# Patient Record
Sex: FEMALE | Race: WHITE | NOT HISPANIC OR LATINO | Employment: UNEMPLOYED | URBAN - METROPOLITAN AREA
[De-identification: names, ages, dates, MRNs, and addresses within clinical notes are randomized per-mention and may not be internally consistent; named-entity substitution may affect disease eponyms.]

---

## 2024-11-12 ENCOUNTER — TELEPHONE (OUTPATIENT)
Dept: PAIN MEDICINE | Facility: CLINIC | Age: 44
End: 2024-11-12

## 2024-11-12 NOTE — TELEPHONE ENCOUNTER
Received fax from ortho for patient from external provider. Patient needs either IPM or NRSX. Called patient and advised her that we are not taking any new Medicaid patients at this time. Advised patient to refer back to ordering provider or contact the Medicaid office. I also provided her with [facility]'s number to see if they can provide direction to a participating provider. Patient was very appreciative and v/u.  Grace Flores RN

## 2024-11-18 ENCOUNTER — TELEPHONE (OUTPATIENT)
Dept: SURGERY | Facility: CLINIC | Age: 44
End: 2024-11-18
Payer: MEDICAID

## 2024-12-02 NOTE — PROGRESS NOTES
"Initial Consult  Gina Wang  Chief Complaint   Patient presents with    Consult     Interested in VSG       History of Present Illness:  Patient is a 44 y.o. female who is referred for evaluation of surgical treatment of morbid obesity. Her Body mass index is 41.38 kg/m². She has known comorbidities of diabetes mellitus, dyslipidemia, GERD, hypertension, and obstructive sleep apnea. She has attended the bariatric seminar and is most interested in gastric sleeve surgery. The patient has had multiple unsuccessful diet attempts in the past without sustained weight loss. With multiple unsuccessful weight loss attempts, the patient believes they are ready for surgical intervention.     Denies any complaints or issues today for visit    Increased child after child once she started depo shot, gained 100lbs.   Recently lost 100lbs     Labs (10/3/24): WNLs    Menstrual cycle: hysterectomy  Occupation: does not work.     Anemia/easy brusing/bleeding - [] Yes   [x] No   Smoker - [] Yes   [x] No  [] Resolved  Hx of blood clots - [] Yes   [x] No     Ht: 5' 1" (1.549 m)   Weights:   Trend Weights BMI   Starting 219# 41   Pre-Op     2 Week     2 Month     6 Month      1 Year     2 Year               For Adults 20 Years and Older:  BMI Weight Status   Below 18.5 Underweight   18.6-24.9 Normal/Healthy   25.0-29.9 Overweight   30.0 & Above Obese     Ideal Weight Range for Your Height: 5'1" = 100 - 131 lbs     Pertinent History:  HTN - [x] Yes   [] No    [] Resolved  HLD - [x] Yes   [] No    [] Resolved  DM  -  [x] Yes   [] No    [] Resolved  JALYN - [x] Yes   [] No   [] Resolved, does not wear  GERD - [x] Yes   [] No [] Resolved  Anticoagulation- [] Yes   [x] No      If yes, type: [] ASA [] Plavix [] Other    Patient Care Team:  Deisy Cedillo NP as PCP - General (Internal Medicine)  Akil Stover MD as Consulting Physician (Bariatrics)  Jesús Farley MD as Consulting Physician (Gastroenterology)  Santosh Chambers " "MD DARRYL (Obstetrics and Gynecology)     Subjective:     12 point review of systems conducted, negative except as stated in the history of present illness. See HPI for details.    Review of patient's allergies indicates:   Allergen Reactions    Ortho tri-cyclen (21) Rash     Past Medical History:   Diagnosis Date    ADD (attention deficit disorder)     Diabetes mellitus, type 2     GERD (gastroesophageal reflux disease)     Hyperlipidemia     Hypertension     Lipoma of lower leg     Morbid obesity     Sleep apnea      Past Surgical History:   Procedure Laterality Date     SECTION      x2    ELBOW SURGERY      HYSTERECTOMY  12/15/2022    LIPOMA RESECTION Right 2023    Procedure: EXCISION, LIPOMA;  Surgeon: Carly Hernandez MD;  Location: [facility] MAIN OR;  Service: General;  Laterality: Right;  Intramuscular    MASS EXCISION       Family History   Problem Relation Name Age of Onset    Stroke Mother      Hypertension Mother      Stroke Father      Hypertension Father      Heart attack Father      Cancer Maternal Grandmother      Heart attack Maternal Grandfather      Cancer Paternal Grandmother       Social History     Tobacco Use    Smoking status: Never    Smokeless tobacco: Never   Substance Use Topics    Alcohol use: Never    Drug use: Never      Current Outpatient Medications   Medication Instructions    diclofenac (VOLTAREN) 75 mg, 2 times daily    hydroCHLOROthiazide (HYDRODIURIL) 25 mg, Every morning    lactulose (CEPHULAC) 20 g, 3 times daily    lovastatin (MEVACOR) 10 mg    methocarbamoL (ROBAXIN) 500 mg, 4 times daily    metoprolol tartrate (LOPRESSOR) 50 mg    MOUNJARO 10 mg, Weekly    pantoprazole (PROTONIX) 20 mg, Daily    polyethylene glycol (GLYCOLAX) 17 gram PwPk Take by mouth.       Objective:     Vital Signs (Most Recent):  Visit Vitals  /74   Pulse 100   Ht 5' 1" (1.549 m)   Wt 99.3 kg (219 lb)   BMI 41.38 kg/m²       Physical Exam:  General:  Alert and oriented. " Obese  Respiratory:  Lungs are clear to auscultation, Respirations are non-labored, Breath sounds are equal.    Cardiovascular:  Normal rate, Regular rhythm, No murmur.    Gastrointestinal:  Soft, Non-tender, Non-distended, Normal bowel sounds        Musculoskeletal:  Normal range of motion, Normal strength.    Integumentary:  Warm, Dry, Pink.    Neurologic:  Alert, Oriented.    Psychiatric:  Cooperative.      ASSESSMENT/PLAN:        1. Hypertension, unspecified type        2. Hyperlipidemia, unspecified hyperlipidemia type        3. Type 2 diabetes mellitus with other specified complication, unspecified whether long term insulin use        4. JALYN (obstructive sleep apnea)        5. Encounter for weight loss counseling        6. Dietary counseling        7. Exercise counseling        8. Encounter for pre-bariatric surgery counseling and education        9. Morbid obesity            Plan:  Gina Wang is morbidly obese (Body mass index is 41.38 kg/m².) with significant weight related co-morbidity including: diabetes mellitus, dyslipidemia, GERD, hypertension, and obstructive sleep apnea. The patient has been unable to lose weight and improve their co-morbid conditions with medical management including diet and exercise. She understands that this is a tool and lifestyle change will be necessary to keep weight off.     RECOMMENDATION: Weight loss surgery. The risks, benefits, and alternatives, including gastric sleeve surgery (which the patient prefers) were discussed at length and all of their questions were answered. The patient appears to understand and wishes to proceed.     Risk of pregnancy discussed with pt's of child bearing age. Avoid pregnancy up to 18 months after bariatric procedure to maximize weight loss and avoid subsequent vitamin and mineral deficiencies and/or possible miscarriage due to low caloric intake.   - pt had hysterectomy     The patient was given the following  instructions:  H.Pylori breat test  Once H.Pylori back will need EGD set up  Request records from Dr. Godfrey  Request records from Dr. Santosh Nunez.   Refer pt to [facility].   3 months of medically supervised weight loss visits with the dietitian  Needs psychological evaluations and clearance.  Must attend a preoperative bariatric class.  Discussion with pt regarding steroids and NSAIDs post surgery. Will not be able to use these after surgery due to risk of ulceration, perforation, esophagitis, gastritis, bleeding, etc. Pt verbalized understanding.   The patient clearly understood that surgery would only be scheduled if there were no medical or psychiatric contraindications and a second office visit is required.

## 2024-12-06 ENCOUNTER — TELEPHONE (OUTPATIENT)
Dept: NEUROSURGERY | Facility: CLINIC | Age: 44
End: 2024-12-06
Payer: MEDICAID

## 2024-12-06 ENCOUNTER — CLINICAL SUPPORT (OUTPATIENT)
Dept: BARIATRICS | Facility: HOSPITAL | Age: 44
End: 2024-12-06

## 2024-12-06 ENCOUNTER — OFFICE VISIT (OUTPATIENT)
Dept: SURGERY | Facility: CLINIC | Age: 44
End: 2024-12-06
Payer: MEDICAID

## 2024-12-06 VITALS
SYSTOLIC BLOOD PRESSURE: 113 MMHG | HEIGHT: 61 IN | BODY MASS INDEX: 41.35 KG/M2 | WEIGHT: 219 LBS | HEART RATE: 100 BPM | DIASTOLIC BLOOD PRESSURE: 74 MMHG

## 2024-12-06 VITALS — BODY MASS INDEX: 41.31 KG/M2 | WEIGHT: 218.81 LBS | HEIGHT: 61 IN

## 2024-12-06 DIAGNOSIS — E66.01 MORBID OBESITY: ICD-10-CM

## 2024-12-06 DIAGNOSIS — Z71.3 ENCOUNTER FOR WEIGHT LOSS COUNSELING: ICD-10-CM

## 2024-12-06 DIAGNOSIS — Z71.82 EXERCISE COUNSELING: ICD-10-CM

## 2024-12-06 DIAGNOSIS — Z71.89 ENCOUNTER FOR PRE-BARIATRIC SURGERY COUNSELING AND EDUCATION: ICD-10-CM

## 2024-12-06 DIAGNOSIS — E11.69 TYPE 2 DIABETES MELLITUS WITH OTHER SPECIFIED COMPLICATION, UNSPECIFIED WHETHER LONG TERM INSULIN USE: ICD-10-CM

## 2024-12-06 DIAGNOSIS — E78.5 HYPERLIPIDEMIA, UNSPECIFIED HYPERLIPIDEMIA TYPE: ICD-10-CM

## 2024-12-06 DIAGNOSIS — E66.9 OBESITY: Primary | ICD-10-CM

## 2024-12-06 DIAGNOSIS — Z71.3 DIETARY COUNSELING: ICD-10-CM

## 2024-12-06 DIAGNOSIS — I10 HYPERTENSION, UNSPECIFIED TYPE: Primary | ICD-10-CM

## 2024-12-06 DIAGNOSIS — E66.813 CLASS 3 SEVERE OBESITY WITH BODY MASS INDEX (BMI) OF 40.0 TO 44.9 IN ADULT, UNSPECIFIED OBESITY TYPE, UNSPECIFIED WHETHER SERIOUS COMORBIDITY PRESENT: Primary | ICD-10-CM

## 2024-12-06 DIAGNOSIS — E66.01 CLASS 3 SEVERE OBESITY WITH BODY MASS INDEX (BMI) OF 40.0 TO 44.9 IN ADULT, UNSPECIFIED OBESITY TYPE, UNSPECIFIED WHETHER SERIOUS COMORBIDITY PRESENT: Primary | ICD-10-CM

## 2024-12-06 DIAGNOSIS — G47.33 OSA (OBSTRUCTIVE SLEEP APNEA): ICD-10-CM

## 2024-12-06 RX ORDER — POLYETHYLENE GLYCOL 3350 17 G/17G
POWDER, FOR SOLUTION ORAL
COMMUNITY

## 2024-12-06 RX ORDER — PANTOPRAZOLE SODIUM 20 MG/1
20 TABLET, DELAYED RELEASE ORAL DAILY
COMMUNITY

## 2024-12-06 NOTE — PROGRESS NOTES
BEHAVIOR MODIFICATION AND EXERCISE CONSULT    PERSONAL:     What initiated your interest in bariatric surgery?   Gained weight after being put on the depo birth control shot, pregnancies.     Marital Status: [x]Single   []   []   []      Do you have children? 2 (22,9)    Do you have childcare issues?    What is your highest level of education completed? 9-10th    Who is your social or relational support? Son and     Do you work? []Yes   [x]No   []Disabled   []Retired    If yes, what is your occupation?    Do you enjoy your work?     Were there any particular events that lead to significant weight gain? []Loss of a loved one  [x]Pregnancy  []Trauma, accident, illness  []Loss of employment []Other    PHYSICAL ACTIVITY:    Do you currently exercise: [x]Yes   []No    If so, please describe:walk on treadmill 4-5x/week one mile    Have you experienced any injuries and/or restrictions that may limit your physical activity? []Yes   [x]No    If so, please describe:     BEHAVIORS:    What behavior(s) would you like to change in order to be healthy? Eat healthy, exercise more    On a scale of 1-10 (1-extremely low, 10-extremely high), how motivated are you to change your behavior(s)? 10    Do you currently use any type of tobacco products (vape, dip, cigarettes, etc.) No    If yes, on average, how many and/or how often do you use these products on a daily basis?    How many hours of sleep do you average? 5-6 (diagnosed with sleep apnea a few years ago/doesn't wear cpap)    Rate your stress level on a scale of 1-10 (1-extremely low, 10-extremely high) 5    What is your biggest life stressor? health    How do you cope and/or manage stress? Go in room and relax or drive    Is your appetite affected by stress, boredom, depression, etc.? bored    Have you ever seen a counselor or therapist? Yes       CURRENT WEIGHT: 218.8  HIGHEST WEIGHT: 315 LOWEST ADULT WEIGHT: 200 GOAL WEIGHT: 150    WAIST/HIP:  47.5/51    HANDOUTS: preop book. Read pg. 18    NOTES: body composition was conducted and patient was educated on results.       RICARDO Yoon, CPT, CHC

## 2024-12-06 NOTE — PROGRESS NOTES
"NUTRITIONAL CONSULT    Initial assessment for sleeve gastrectomy  Non Surgical: []    PAST HISTORY:   Dieting attempts include Diet supplements/ medication Mounjaro  Highest adult weight: 315#  How many years at present wt: 1.5 years recently lost 100#s when started Mounjaro for diabetes  Greatest single wt loss: lost 100#s on mounjaro past 1.5 years    CLINICAL DATA:  Height:   Ht Readings from Last 1 Encounters:   24 5' 1" (1.549 m)      Weight:   Wt Readings from Last 3 Encounters:   24 0939 99.2 kg (218 lb 12.8 oz)   10/03/24 1445 100.5 kg (221 lb 8 oz)   23 0528 114.8 kg (253 lb)      IBW: 105 lbs   BMI:   BMI Readings from Last 1 Encounters:   24 41.34 kg/m²      Bariatric goal weight (125% EBW): 131 lbs  Patient's goal weight: 125 lbs    FAMILY HISTORY OF OBESITY:  Yes   WHAT SIDE OF THE FAMILY?   [x]Mother   []Father   []Sibling   []Child     [x]Extended    []Adopted     Goal for Bariatric Surgery: to improve health, to improve quality of life, to lose weight, and to prevent future medical conditions    NUTRITION & HEALTH HISTORY:  Greatest challenge:  undereating, low protein intake    Current diet recall:   Skipped breakfast   Skipped lunch  3 chicken tenders in air fryer and green beans    Current Dietary Patterns:  Meal pattern: 1- (2 meals on weekend will go out to eat)  Snackin / day Type:   Vegetables: Likes a variety. Eats daily.  Fruits: Likes a variety. Eats rarely.  Beverages: water and diet tea  Alcohol consumption: Never. Type:   Dining out: Weekly. Mostly restaurants.  Grocery shopping and food prep: Yes[x]Self   []Spouse   []Other:   Emotional eating: No Which emotions if yes:   Nighttime snacking: No Middle of night: No Before bedtime: No  Hx of disordered eating behaviors: Yes Anorexia: No Bulimia: No Purging: No Binging:Yes, dx 5-6 years ago, dr. prescribed vyvanse for it  History of vitamin/mineral deficiencies:   Yes Vit D    Vitamins / Minerals / Herbs:   MVI, " zinc takes inconsistently, Vit D    Food Allergies:   Dairy, eggs, pork       ASSESSMENT:  Patient reports attempts at weight loss, only to regain lost weight.  Patient demonstrated knowledge of healthy eating behaviors and exercise patterns; admits to not eating healthy and not exercising at this point.  Patient states willingness to change lifestyle and make behavior modifications.  Expect good  compliance after surgery at this time.        ESTIMATED NEEDS: 60 kg  Calories: 5909-4715 (20-25 kcal/kg adjusted BW/d)  Protein: 60-66 (1.0-1.1 g/kg adjusted BW/d)  Fluid:  1200 (20 mL/kg actual BW/d)    BARIATRIC DIET DISCUSSION:  Discussed diet after surgery and related to patients food record.  Reviewed diet progression before and after surgery.  Reinforced that surgery is not a magic bullet and importance of low fat foods and no snacking.  Answered all questions.    RECOMMENDATIONS:  Patient is a good candidate for bariatric surgery.      PLAN:  Continue to review Bariatric Nutrition Guidebook at home and call with any questions.  Work on Bariatric Nutrition Checklist.  1200-calorie diet.  5-6 meals per day.  Increase meal frequency/protein even with suppressed appetite on Mounjaro

## 2024-12-06 NOTE — TELEPHONE ENCOUNTER
----- Message from Melyssa sent at 12/6/2024  3:07 PM CST -----  Contact: Shasha/[facility]  Shasha with [facility] is calling to speak with someone regarding referral. Reports sending referral to Neurosurgery department and request to confirm status of referral. Please give [facility] a call back at [phone] to assist.  Thank you,  [facility]

## 2025-03-25 ENCOUNTER — TELEPHONE (OUTPATIENT)
Dept: SURGERY | Facility: CLINIC | Age: 45
End: 2025-03-25
Payer: MEDICAID

## 2025-03-25 NOTE — TELEPHONE ENCOUNTER
Called to request records again, patient is reaching out to her GYN office they aren't able to find her in there system when I called, she has a cardiac appt scheduled for may 12,2025     Reminder set

## 2025-04-15 ENCOUNTER — CLINICAL SUPPORT (OUTPATIENT)
Dept: BARIATRICS | Facility: HOSPITAL | Age: 45
End: 2025-04-15

## 2025-04-15 VITALS — BODY MASS INDEX: 40.89 KG/M2 | WEIGHT: 216.38 LBS

## 2025-04-15 DIAGNOSIS — E66.01 MORBID OBESITY WITH BMI OF 40.0-44.9, ADULT: Primary | ICD-10-CM

## 2025-04-15 PROCEDURE — 94200034 HC BARIATRIC NUTRITIONAL SVCS PT GRADE I

## 2025-04-15 NOTE — PROGRESS NOTES
"Patient Education [x] MSWL Visit Number: 1/3     []  Pre-op Wt Loss Goal (as ordered on referral):   [] NSWL Visit:     Height:   Ht Readings from Last 1 Encounters:   02/12/25 5' 1" (1.549 m)      Weight:   Wt Readings from Last 3 Encounters:   04/15/25 1106 98.2 kg (216 lb 6.4 oz)   02/12/25 1241 97.5 kg (215 lb)   12/06/24 1007 99.3 kg (219 lb)      BMI:   BMI Readings from Last 1 Encounters:   04/15/25 40.89 kg/m²                                                                        Barriers to learning:  [x]None evident  []Acuity of illness  []Cognitive defects  []Cultural barriers  []Desire/Motivation  []Difficulty concentrating  []Emotional state  []Financial concerns  []Hearing deficit  []Language barrier  []Literacy  []Memory problems  []Vision impairment     Home caregiver present for session   []YES  [x] NO     Teaching methods:  [x]Demonstration  []Explanation  []Printed materials  []Teach back  []Virtual/web based     Verbalizes understanding Demonstrates  Needs further teaching Needs practice/ supervision Comments    Bariatric Surgery Diet  [] [] [] []    Clear liquid [] [] [] []    Full liquid [] [] [] []    Weight Reduction [x] [] [] []    Other Diet [] [] [] []      Expected Compliance:  [x]Good  []Fair  []Poor    Additional Information:    Pt presents for 1/3 MSWL visit. Pt has diabetes and has been taking Mounjaro for several months now which suppresses her appetite. She normally eats one meal daily because of this. She has trouble tolerating many foods and also has multiple food allergies including: dairy, eggs, pork, and shrimp so she tends to eat the same meal every single day. See below. She reports she may have a shrimp po boy on the weekend (even though allergic) but other than that every day is the same. Main goal this month is to increase meal frequency and protein intake. Also discussed plant based protein supplements pt can try instead of dairy based.     24 hr recall:  Skips " breakfast  Skips lunch  Dinner: unbreaded 3 air fried chicken tenders and green beans  Has not been drinking as much water recently- 1-2 bottles/day    Plan:  Try plant based supplements  Increase meal frequency- 3 meals daily with protein  Increase water intake to  oz daily

## 2025-05-12 ENCOUNTER — CLINICAL SUPPORT (OUTPATIENT)
Dept: BARIATRICS | Facility: HOSPITAL | Age: 45
End: 2025-05-12

## 2025-05-12 VITALS — WEIGHT: 218.5 LBS | BODY MASS INDEX: 41.29 KG/M2

## 2025-05-12 DIAGNOSIS — E66.01 MORBID OBESITY WITH BMI OF 40.0-44.9, ADULT: Primary | ICD-10-CM

## 2025-05-12 PROCEDURE — 94200034 HC BARIATRIC NUTRITIONAL SVCS PT GRADE I

## 2025-05-12 NOTE — PROGRESS NOTES
"Patient Education [x] MSWL Visit Number: 2/3     []  Pre-op Wt Loss Goal (as ordered on referral):   [] NSWL Visit:     Height:   Ht Readings from Last 1 Encounters:   02/12/25 5' 1" (1.549 m)      Weight:   Wt Readings from Last 3 Encounters:   05/12/25 1253 99.1 kg (218 lb 8 oz)   04/15/25 1106 98.2 kg (216 lb 6.4 oz)   02/12/25 1241 97.5 kg (215 lb)      BMI:   BMI Readings from Last 1 Encounters:   05/12/25 41.29 kg/m²                                                                        Barriers to learning:  [x]None evident  []Acuity of illness  []Cognitive defects  []Cultural barriers  []Desire/Motivation  []Difficulty concentrating  []Emotional state  []Financial concerns  []Hearing deficit  []Language barrier  []Literacy  []Memory problems  []Vision impairment     Home caregiver present for session   []YES  [x] NO     Teaching methods:  [x]Demonstration  [x]Explanation  [x]Printed materials  [x]Teach back  []Virtual/web based     Verbalizes understanding Demonstrates  Needs further teaching Needs practice/ supervision Comments    Bariatric Surgery Diet  [] [] [] []    Clear liquid [] [] [] []    Full liquid [] [] [] []    Weight Reduction [x] [] [] []    Other Diet [] [] [] []      Expected Compliance:  [x]Good  []Fair  []Poor    Additional Information:    Pt present for 2/3 MSWL visit with a 2# weight gain. Pt said normal for her weight to fluctuate 5#s on the regular- on fluid pill. Pt has been increasing water intake, adding in protein to regimen, and working on bringing lunch to work instead of skipping. Also said tried plant based protein shakes and is able to tolerate. Discussed incorporating breakfast into regimen and discussed pre-op diet with pt since she is not eating starches/large portions daily anyway. Main goal is to start meal prepping this month. Also discussed completing checklist for surgery like MHS.    24 hr recall:  Breakfast: Skipped  Lunch: Tuna packet with SF buffalo sauce  Dinner: " unbreaded 3 air fried chicken tenders and green beans  Drink:  oz water    Plan:  Use pre-op diet to help portion out meals  Do not skip meals- eat 3 meals daily with lean source of protein  Continue replacing meals with plant based protein shakes if needed  Continue drinking  oz water daily

## 2025-05-15 ENCOUNTER — TELEPHONE (OUTPATIENT)
Dept: SURGERY | Facility: CLINIC | Age: 45
End: 2025-05-15
Payer: MEDICAID

## 2025-05-15 NOTE — TELEPHONE ENCOUNTER
----- Message from Luis Manuel Wells sent at 5/14/2025  3:47 PM CDT -----  3. [facility]  ----- Message -----  From: Priscilla Combs MA  Sent: 5/14/2025   3:44 PM CDT  To: [facility] Staff    1. Dr. Santosh PARK 2. Dr. Jesús Godfrey - GI

## 2025-05-15 NOTE — TELEPHONE ENCOUNTER
Requested all     Had to contact [facility] for  office   Left message for  office for medical records     Sent another request to cards for records since appt was just a few days ago     Will continue to follow up

## 2025-06-05 ENCOUNTER — PATIENT MESSAGE (OUTPATIENT)
Dept: SURGERY | Facility: CLINIC | Age: 45
End: 2025-06-05
Payer: MEDICAID

## 2025-06-23 ENCOUNTER — CLINICAL SUPPORT (OUTPATIENT)
Dept: BARIATRICS | Facility: HOSPITAL | Age: 45
End: 2025-06-23

## 2025-06-23 VITALS — WEIGHT: 217.19 LBS | BODY MASS INDEX: 41.04 KG/M2

## 2025-06-23 DIAGNOSIS — E66.01 MORBID OBESITY WITH BMI OF 40.0-44.9, ADULT: Primary | ICD-10-CM

## 2025-06-23 PROCEDURE — 94200034 HC BARIATRIC NUTRITIONAL SVCS PT GRADE I

## 2025-06-23 NOTE — PROGRESS NOTES
"Patient Education [x] [facility] Visit Number: 3/3     []  Pre-op Wt Loss Goal (as ordered on referral):   [] [facility] Visit:     Height:   Ht Readings from Last 1 Encounters:   02/12/25 5' 1" (1.549 m)      Weight:   Wt Readings from Last 3 Encounters:   06/23/25 1356 98.5 kg (217 lb 3.2 oz)   05/12/25 1253 99.1 kg (218 lb 8 oz)   04/15/25 1106 98.2 kg (216 lb 6.4 oz)      BMI:   BMI Readings from Last 1 Encounters:   06/23/25 41.04 kg/m²                                                                        Barriers to learning:  [x]None evident  []Acuity of illness  []Cognitive defects  []Cultural barriers  []Desire/Motivation  []Difficulty concentrating  []Emotional state  []Financial concerns  []Hearing deficit  []Language barrier  []Literacy  []Memory problems  []Vision impairment     Home caregiver present for session   []YES  [x] NO     Teaching methods:  [x]Demonstration  [x]Explanation  [x]Printed materials  []Teach back  []Virtual/web based     Verbalizes understanding Demonstrates  Needs further teaching Needs practice/ supervision Comments    Bariatric Surgery Diet  [] [] [] []    Clear liquid [] [] [] []    Full liquid [] [] [] []    Weight Reduction [x] [] [] []    Other Diet [] [] [] []      Expected Compliance:  [x]Good  []Fair  []Poor    Additional Information:    Pt presents with a 1# weight loss. She did enjoy more starches a couple nights on vacation to the beach but overall prioritized protein and veggies. Most days looked like 24 hour recall below. Still skipping breakfast. Rec'd to add 1-2 boiled eggs in morning to help increase meal frequency. Pt has been trying out plant protein shakes since allergic to milk. She is still looking for one she enjoys.     24 hr recall:  Breakfast: Skipped  Lunch: unbreaded 2 air fried chicken tenders and green beans  Dinner: unbreaded 2 air fried chicken tenders and green beans  Drink:  oz water    Plan:  Complete checklist for surgery  Continue trying out plant " based protein shakes for surgery  Incorporate a breakfast with protein source instead of skipping

## 2025-06-24 ENCOUNTER — TELEPHONE (OUTPATIENT)
Dept: SURGERY | Facility: CLINIC | Age: 45
End: 2025-06-24
Payer: MEDICAID

## 2025-06-24 ENCOUNTER — PATIENT MESSAGE (OUTPATIENT)
Dept: SURGERY | Facility: CLINIC | Age: 45
End: 2025-06-24
Payer: MEDICAID

## 2025-06-24 NOTE — TELEPHONE ENCOUNTER
Called pt to discuss. Ideally wanting to schedule surgery in August if possible.   Went over missing requirements. Pt will complete hpylori at [facility] this week. She is calling to schedule her MHS and will let me know when it is. She said her cardio appt was this month but they called her and moved her to 8/4/25.   We discussed surgery date of 8/20/25 if all goes well.   EGD completed 4/2025 with Dr. Godfrey - scanned into chart.

## 2025-06-24 NOTE — TELEPHONE ENCOUNTER
Procedure:  [x] VSG    [] RNY GBP   [] Revision   Insurance: Count includes the [facility]  MD: [] Dr. Bee     [] Dr. Dang     [x] Dr. Stover   Assist:  [] N/A    [] Dr. Bee     [] Dr. Dang     [] Dr. Stover   [] NP -     Surgery Date:  8/20/25   Education Class: 7/22/25   Pre Op Appt: 8/8/25 at 10:00   Facility:  [] [facility]  [] [facility]  [x] [facility]  Inpatient/ Outpatient:  [x] Inpatient  [] Outpatient      Confirmed the following with patient:  Smoking Hx: [] Yes   [x] No  Personal h/o blood clots/bleeding disorders:  [] Yes  [x] No  Family h/o blood clots/bleeding disorders: [] Yes  [x] No  Taking oral contraceptives/hormone meds/testosterone:  [] Yes  [x] No  Scheduled elective procedures/ travel in the next 30 days:  [] Yes  [x] No  GLP/Blood Thinners:  [x] Yes  [] No Mounjaro   Specialist: [x] Yes  [] No Seeing cardio 8/4/25     Please create case request.      Reminder set to FU on hpylori and MHS. Pt states she's doing hpylori at [facility] 6/27/25

## 2025-06-24 NOTE — TELEPHONE ENCOUNTER
"I believe we only sent her to cards for BP/ HTN management, ill have to send a clearance request   I just called about it they said " that's medicaid we shouldn't have ever scheduled that we do no see medicaid" " oh no we can't see her " so il be sending to      They had actually moved her appt to 08/04/25 - but ill get her set up with    "

## 2025-06-24 NOTE — TELEPHONE ENCOUNTER
----- Message from Dietitishanice Cheung sent at 6/23/2025  2:05 PM CDT -----  Regarding: Pt Completed 3/3 MSWL Visits  Priscilla- pt has completed visits.     Also she said:    - She is seeing PCP on 8/4 to get cardiac clearance.   - She wants to wait until closer to surgery to do H. Pylori bc she will have to get off of her     stomach medication and her doctor does not want her to.   - I gave her a list of providers to call for MHS today at her session.

## 2025-06-30 DIAGNOSIS — E66.01 MORBID OBESITY: Primary | ICD-10-CM

## 2025-07-02 ENCOUNTER — TELEPHONE (OUTPATIENT)
Dept: SURGERY | Facility: CLINIC | Age: 45
End: 2025-07-02
Payer: MEDICAID

## 2025-07-02 ENCOUNTER — PATIENT MESSAGE (OUTPATIENT)
Dept: SURGERY | Facility: CLINIC | Age: 45
End: 2025-07-02
Payer: MEDICAID

## 2025-07-02 NOTE — TELEPHONE ENCOUNTER
----- Message from Luis Manuel Wells sent at 7/2/2025  7:41 AM CDT -----  Regarding: h.pylori  Please FU on h.pylori. pt going to outside facility

## 2025-07-08 ENCOUNTER — TELEPHONE (OUTPATIENT)
Dept: SURGERY | Facility: CLINIC | Age: 45
End: 2025-07-08
Payer: MEDICAID

## 2025-07-08 NOTE — TELEPHONE ENCOUNTER
----- Message from Med Assistant Harris sent at 6/24/2025  3:18 PM CDT -----  Regarding: fu referral/ clearance  Fu on referral/ clearance sent to

## 2025-07-08 NOTE — TELEPHONE ENCOUNTER
I actually contacted pt as well this morning to postpone because her requirements have not been completed.   She would like to stay in the program and continue with her monthly weigh ins and call when she's ready to reschedule.   She's aware requirements will need to be completed and that previous ones  in 1 year.     Will notify facility of cancellation. Please cancel all upcoming appts. Augustina - she would like to continue with her monthly weigh-ins so just reach out to her in regards to scheduling.

## 2025-07-08 NOTE — TELEPHONE ENCOUNTER
Spoke with patient they did call her but at this time she is taking care of her  at thsi time he is very sick, she may also need to postpone surgery until he gets better   Please reach out to the patient to discuss, thanks

## 2025-07-22 ENCOUNTER — CLINICAL SUPPORT (OUTPATIENT)
Dept: BARIATRICS | Facility: HOSPITAL | Age: 45
End: 2025-07-22

## 2025-07-22 VITALS — WEIGHT: 214.81 LBS | BODY MASS INDEX: 40.59 KG/M2

## 2025-07-22 DIAGNOSIS — E66.01 MORBID OBESITY WITH BMI OF 40.0-44.9, ADULT: Primary | ICD-10-CM

## 2025-08-01 ENCOUNTER — TELEPHONE (OUTPATIENT)
Dept: SURGERY | Facility: CLINIC | Age: 45
End: 2025-08-01
Payer: MEDICAID

## 2025-08-01 NOTE — TELEPHONE ENCOUNTER
Patient has failed to get her [facility]   I did contact patient they also contacted her from [facility] and she also did not respond to their phone calls more written correspondence